# Patient Record
(demographics unavailable — no encounter records)

---

## 2025-07-10 NOTE — PHYSICAL EXAM
[Chaperoned Physical Exam] : A chaperone was present in the examining room during all aspects of the physical examination. [MA] : MA [Appropriately responsive] : appropriately responsive [Alert] : alert [No Acute Distress] : no acute distress [Soft] : soft [Non-tender] : non-tender [Non-distended] : non-distended [Oriented x3] : oriented x3 [Examination Of The Breasts] : a normal appearance [No Discharge] : no discharge [No Masses] : no breast masses were palpable [Labia Majora] : normal [Labia Minora] : normal [Atrophy] : atrophy [Dry Mucosa] : dry mucosa [No Bleeding] : There was no active vaginal bleeding [Normal] : normal [Uterine Adnexae] : non-palpable

## 2025-07-11 NOTE — REVIEW OF SYSTEMS
[Negative] : Breast [Incontinence] : incontinence [Genital Rash/Irritation] : genital rash/irritation [FreeTextEntry8] : Vaginal dryness/pain with sex.

## 2025-07-11 NOTE — PLAN
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  We discussed menopausal changes in the vagina and bladder secondary to the significant reduction in estrogen. I explained that unlike the other symptoms of menopause that typically resolve over time, genitourinary syndrome of menopause typically worsens if left untreated.   We discussed the use of over the counter vaginal lubricants for intercourse, including the use of coconut oil both internally and externally.   We also discussed the benefits of over the counter vaginal moisturizers. We discussed the fact that these are recommended to be used regularly 2-3 times a week for maximum benefit.   We also discussed the benefits and roles of local menopausal hormone therapy via vaginal estrogen. She is aware that there is a minimal to insignificant absorption into the bloodstream with vaginal estrogen. This is regarded as very safe by the FDA. We even discussed that ACOG recommends it as a treatment option even in women with a history of breast cancer.   We discussed the different formulations of vaginal estrogen therapy including estrogen cream, vaginal estrogen tablets, and a vaginal estrogen ring. Prescription was sent to the pharmacy for vagifem. She is aware that for the first 2 weeks, she needs to insert the tablet vaginally nightly, and then after the first 2 weeks it is 2 times per week. She is aware that she may not have a significant improvement in symptoms for 3 months. If there is not significant improvement after 3 months, she was instructed to follow up. She is aware that she can also use both vaginal moisturizers and vaginal estrogen for added improvement. She is aware she would need to alternate the insertion of both and I recommend against inserting both at the same time.   She is aware that if she has any vaginal bleeding after the first 3 months of initiating treatment, she should return for a uterine evaluation. She is also aware that she may still require lubrication for intercourse. Questions answered.  Up to date with colon cancer screening.   Referral for urogynecology given secondary to urinary incontinence.  Pap done today.  Prescription for DEXA studies were given secondary to a history of osteopenia.   Prescription for mammogram screening and breast US given. Self-breast exam reviewed.  She will follow up annually and as needed.

## 2025-07-11 NOTE — HISTORY OF PRESENT ILLNESS
[postmenopausal] : postmenopausal [Y] : Positive pregnancy history [Menopause Age: ____] : age at menopause was [unfilled] [No] : Patient does not have concerns regarding sex [Currently Active] : currently active [Men] : men [Mammogramdate] : 10+ years ago [BreastSonogramDate] : 10+ years ago [PapSmeardate] : 10+ years ago [BoneDensityDate] : 10+ years ago [TextBox_37] : AS PER PT, OSTEOPOROSIS. [ColonoscopyDate] : 10+ years ago [LMPDate] : 2003 [PGHxTotal] : 3 [[facility] [[facility]] : 1 [PGHxABSpont] : 2 [FreeTextEntry1] : AT AGE 45

## 2025-07-11 NOTE — END OF VISIT
[FreeTextEntry3] : I, Kevin Cruz solely acted as scribe for Dr. Swati Singletary on 07/10/2025  All medical entries made by the Scribe were at my, Dr. Estrada, direction and personally dictated by me on 07/10/2025 . I have reviewed the chart and agree that the record accurately reflects my personal performance of the history, physical exam, assessment and plan. I have also personally directed, reviewed, and agreed with the chart.

## 2025-07-11 NOTE — HISTORY OF PRESENT ILLNESS
[postmenopausal] : postmenopausal [Y] : Positive pregnancy history [Menopause Age: ____] : age at menopause was [unfilled] [No] : Patient does not have concerns regarding sex [Currently Active] : currently active [Men] : men [Mammogramdate] : 10+ years ago [BreastSonogramDate] : 10+ years ago [PapSmeardate] : 10+ years ago [BoneDensityDate] : 10+ years ago [TextBox_37] : AS PER PT, OSTEOPOROSIS. [ColonoscopyDate] : 10+ years ago [LMPDate] : 2003 [PGHxTotal] : 3 [[facility] [[facility]] [facility] 1 [PGHxABSpont] : 2 [FreeTextEntry1] : AT AGE 45